# Patient Record
Sex: FEMALE | Race: WHITE | HISPANIC OR LATINO | URBAN - METROPOLITAN AREA
[De-identification: names, ages, dates, MRNs, and addresses within clinical notes are randomized per-mention and may not be internally consistent; named-entity substitution may affect disease eponyms.]

---

## 2021-01-08 ENCOUNTER — APPOINTMENT (RX ONLY)
Dept: URBAN - METROPOLITAN AREA CLINIC 55 | Facility: CLINIC | Age: 41
Setting detail: DERMATOLOGY
End: 2021-01-08

## 2021-01-08 DIAGNOSIS — Z41.9 ENCOUNTER FOR PROCEDURE FOR PURPOSES OTHER THAN REMEDYING HEALTH STATE, UNSPECIFIED: ICD-10-CM

## 2021-01-08 PROCEDURE — ? BOTOX

## 2021-01-08 NOTE — PROCEDURE: BOTOX
Show Masseter Units: Yes
Show Right And Left Brow Units: No
Anterior Platysmal Bands Units: 0
Additional Area 3 Location: Chin
Consent: Written consent obtained. Risks include but not limited to lid/brow ptosis, bruising, swelling, diplopia, temporary effect, incomplete chemical denervation.
Post-Care Instructions: Patient instructed to not lie down for 4 hours and limit physical activity for 24 hours.
Detail Level: Detailed
Lot #: [id number]
Additional Area 2 Location: Upper lip
Additional Area 1 Location: Left Lateral Brow
Expiration Date (Month Year): 9/30/2023
Depressor Anguli Oris Units: 6
Forehead Units: 8
Dilution (U/0.1 Cc): 4

## 2021-04-02 ENCOUNTER — APPOINTMENT (RX ONLY)
Dept: URBAN - METROPOLITAN AREA CLINIC 55 | Facility: CLINIC | Age: 41
Setting detail: DERMATOLOGY
End: 2021-04-02

## 2021-04-02 DIAGNOSIS — Z41.9 ENCOUNTER FOR PROCEDURE FOR PURPOSES OTHER THAN REMEDYING HEALTH STATE, UNSPECIFIED: ICD-10-CM

## 2021-04-02 PROCEDURE — ? BOTOX

## 2021-04-02 NOTE — PROCEDURE: BOTOX
Left Pupillary Line Units: 0
Show Lateral Platysmal Band Units: Yes
Additional Area 3 Location: Chin
Dilution (U/0.1 Cc): 4
Additional Area 1 Location: Left Lateral Brow
Show Ucl Units: No
Depressor Anguli Oris Units: 6
Detail Level: Detailed
Additional Area 2 Location: Upper lip
Consent: Written consent obtained. Risks include but not limited to lid/brow ptosis, bruising, swelling, diplopia, temporary effect, incomplete chemical denervation.
Post-Care Instructions: Patient instructed to not lie down for 4 hours and limit physical activity for 24 hours.
Lot #: [id number]
Expiration Date (Month Year): 12/2023
Glabellar Complex Units: 8

## 2021-04-26 ENCOUNTER — RX ONLY (OUTPATIENT)
Age: 41
Setting detail: RX ONLY
End: 2021-04-26

## 2021-04-26 RX ORDER — VALACYCLOVIR 500 MG/1
TABLET, FILM COATED ORAL
Qty: 10 | Refills: 0 | Status: CANCELLED

## 2021-05-10 ENCOUNTER — RX ONLY (OUTPATIENT)
Age: 41
Setting detail: RX ONLY
End: 2021-05-10

## 2021-05-10 RX ORDER — VALACYCLOVIR 500 MG/1
TABLET, FILM COATED ORAL
Qty: 10 | Refills: 1 | Status: ERX

## 2021-07-02 ENCOUNTER — APPOINTMENT (RX ONLY)
Dept: URBAN - METROPOLITAN AREA CLINIC 55 | Facility: CLINIC | Age: 41
Setting detail: DERMATOLOGY
End: 2021-07-02

## 2021-07-02 DIAGNOSIS — Z41.9 ENCOUNTER FOR PROCEDURE FOR PURPOSES OTHER THAN REMEDYING HEALTH STATE, UNSPECIFIED: ICD-10-CM

## 2021-07-02 PROCEDURE — ? BOTOX

## 2021-07-02 NOTE — PROCEDURE: BOTOX
Periorbital Skin Units: 4
Right Periorbital Skin Units: 0
Show Periorbital Units: Yes
Lot #: [id number]
Show Right And Left Brow Units: No
Additional Area 3 Location: Chin
Consent: Written consent obtained. Risks include but not limited to lid/brow ptosis, bruising, swelling, diplopia, temporary effect, incomplete chemical denervation.
Depressor Anguli Oris Units: 6
Additional Area 1 Location: Left Lateral Brow
Expiration Date (Month Year): 10/2023
Post-Care Instructions: Patient instructed to not lie down for 4 hours and limit physical activity for 24 hours.
Glabellar Complex Units: 16
Additional Area 2 Location: Upper cutaneous lip
Detail Level: Detailed

## 2021-07-06 ENCOUNTER — APPOINTMENT (RX ONLY)
Dept: URBAN - METROPOLITAN AREA CLINIC 55 | Facility: CLINIC | Age: 41
Setting detail: DERMATOLOGY
End: 2021-07-06

## 2021-07-06 DIAGNOSIS — Z41.9 ENCOUNTER FOR PROCEDURE FOR PURPOSES OTHER THAN REMEDYING HEALTH STATE, UNSPECIFIED: ICD-10-CM

## 2021-07-06 PROCEDURE — ? CLEAR AND BRILLIANT LASER

## 2021-07-06 ASSESSMENT — LOCATION DETAILED DESCRIPTION DERM: LOCATION DETAILED: INFERIOR MID FOREHEAD

## 2021-07-06 ASSESSMENT — LOCATION ZONE DERM: LOCATION ZONE: FACE

## 2021-07-06 ASSESSMENT — LOCATION SIMPLE DESCRIPTION DERM: LOCATION SIMPLE: INFERIOR FOREHEAD

## 2021-07-06 NOTE — PROCEDURE: CLEAR AND BRILLIANT LASER
Post-Procedure Care: Sunscreen and ice applied. Post care reviewed with patient.
Post-Care Instructions: I reviewed with the patient in detail post-care instructions. Patient should stay away from the sun and wear sun protection until treated areas are fully healed.
Consent: Verbal consent obtained, risks reviewed including but not limited to crusting, scabbing, blistering, scarring, darker or lighter pigmentary change, incidental hair removal, bruising, and/or incomplete removal.
Laser Type: Clear+Brilliant
Detail Level: Zone
Topical Anesthesia?: 20% benzocaine, 8% lidocaine, 4% tetracaine
Energy Level: High
Length Of Topical Anesthesia Application (Optional): 60 minutes

## 2021-09-23 ENCOUNTER — RX ONLY (OUTPATIENT)
Age: 41
Setting detail: RX ONLY
End: 2021-09-23

## 2021-09-24 ENCOUNTER — APPOINTMENT (RX ONLY)
Dept: URBAN - METROPOLITAN AREA CLINIC 55 | Facility: CLINIC | Age: 41
Setting detail: DERMATOLOGY
End: 2021-09-24

## 2021-09-24 DIAGNOSIS — Z41.9 ENCOUNTER FOR PROCEDURE FOR PURPOSES OTHER THAN REMEDYING HEALTH STATE, UNSPECIFIED: ICD-10-CM

## 2021-09-24 PROCEDURE — ? DYSPORT

## 2021-09-24 NOTE — PROCEDURE: DYSPORT
Masseter Units: 0
Show Lateral Platysmal Band Units: Yes
Lot #: [id number]
Detail Level: Detailed
Consent: Written consent obtained. Risks include but not limited to lid/brow ptosis, bruising, swelling, diplopia, temporary effect, incomplete chemical denervation.
Dilution (U/0.1 Cc): 10
Show Mentalis Units: No
Glabellar Complex Units: 20
Additional Area 3 Location: Chin
Depressor Anguli Oris Units: 15
Expiration Date (Month Year): 5/31/22
Additional Area 5 Location: right lateral brow
Additional Area 4 Location: left lateral 
Post-Care Instructions: Patient instructed to not lie down for 4 hours and limit physical activity for 24 hours.
Additional Area 2 Location: Upper Lip

## 2021-11-04 ENCOUNTER — APPOINTMENT (RX ONLY)
Dept: URBAN - METROPOLITAN AREA CLINIC 55 | Facility: CLINIC | Age: 41
Setting detail: DERMATOLOGY
End: 2021-11-04

## 2021-11-04 DIAGNOSIS — Z41.9 ENCOUNTER FOR PROCEDURE FOR PURPOSES OTHER THAN REMEDYING HEALTH STATE, UNSPECIFIED: ICD-10-CM

## 2021-11-04 PROCEDURE — ? FRAXEL

## 2021-11-04 NOTE — PROCEDURE: FRAXEL
Number Of Passes: 1
Total Energy In Kj (Optional- Don't Include Units): 1.93
Topical Anesthesia Type: 20% benzocaine, 8% lidocaine, 4% tetracaine
Number Of Passes: 8
Treatment Level: 4
Location: perioral area
Energy(Mj/Cm2): 20
Consent: Verbal consent obtained, risks reviewed including but not limited to pain and incomplete improvement.
Anesthesia Type: 1% lidocaine with epinephrine
Total Coverage: 35%
Depth In Microns (Use Numbers Only, No Special Characters Or $): 202
Total Energy In Kj (Optional- Don't Include Units): 0.25
Depth In Microns (Use Numbers Only, No Special Characters Or $): 036
Treatment Number: 2
Length Of Topical Anesthesia Application (Optional): 60 minutes
Total Coverage: 50%
Post-Care Instructions: I reviewed with the patient in detail post-care instructions. Patient should avoid sun until area fully healed. Samples of Epidermal Repair, Journee and Vaseline sent home with patient.
Energy(Mj/Cm2): 15
Total Coverage: 23%
External Cooling Fan Speed: 5
Wavelength: 1550nm
Wavelength: 1927nm
Treatment Level: 7
Indication: dyschromia
Location: full face
Pre-Procedure Text (Will Appear After Anesthesia Text): Photos obtained. BLT removed with dry gauze. Face was cleansed with a gentle cleanser and then prepped with chlorhexidine gluconate.
Add Post-Care Below To The Note: Yes
Location: cheeks
Detail Level: Zone

## 2021-12-07 ENCOUNTER — APPOINTMENT (RX ONLY)
Dept: URBAN - METROPOLITAN AREA CLINIC 55 | Facility: CLINIC | Age: 41
Setting detail: DERMATOLOGY
End: 2021-12-07

## 2021-12-07 DIAGNOSIS — Z41.9 ENCOUNTER FOR PROCEDURE FOR PURPOSES OTHER THAN REMEDYING HEALTH STATE, UNSPECIFIED: ICD-10-CM

## 2021-12-07 PROCEDURE — ? DYSPORT

## 2021-12-07 NOTE — PROCEDURE: DYSPORT
Glabellar Complex Units: 20
Show Depressor Anguli Units: Yes
Additional Area 5 Location: right lateral brow
Detail Level: Detailed
Depressor Anguli Oris Units: 15
Nasal Root Units: 0
Additional Area 3 Location: Chin
Consent: Written consent obtained. Risks include but not limited to lid/brow ptosis, bruising, swelling, diplopia, temporary effect, incomplete chemical denervation.
Show Lcl Units: No
Additional Area 1 Location: Lateral Brows
Periorbital Skin Units: 10
Additional Area 4 Location: left lateral 
Lot #: [id number]
Additional Area 2 Location: Upper Lip
Expiration Date (Month Year): 7/31/22
Post-Care Instructions: Patient instructed to not lie down for 4 hours and limit physical activity for 24 hours.

## 2022-03-10 ENCOUNTER — APPOINTMENT (RX ONLY)
Dept: URBAN - METROPOLITAN AREA CLINIC 55 | Facility: CLINIC | Age: 42
Setting detail: DERMATOLOGY
End: 2022-03-10

## 2022-03-10 DIAGNOSIS — Z41.9 ENCOUNTER FOR PROCEDURE FOR PURPOSES OTHER THAN REMEDYING HEALTH STATE, UNSPECIFIED: ICD-10-CM

## 2022-03-10 PROCEDURE — ? BOTOX

## 2022-03-10 NOTE — PROCEDURE: BOTOX
Additional Area 5 Units: 0
Additional Area 1 Location: Lateral Brows
Show Right And Left Brow Units: No
Expiration Date (Month Year): 4/2024
Show Additional Area 3: Yes
Depressor Anguli Oris Units: 6
Detail Level: Detailed
Additional Area 3 Location: Chin
Periorbital Skin Units: 4
Additional Area 2 Location: Upper lip
Post-Care Instructions: Patient instructed to not lie down for 4 hours and limit physical activity for 24 hours.
Consent: Written consent obtained. Risks include but not limited to lid/brow ptosis, bruising, swelling, diplopia, temporary effect, incomplete chemical denervation.
Glabellar Complex Units: 8
Additional Area 4 Location: upper cutaneous lip
Lot #: [id number]

## 2022-06-17 ENCOUNTER — APPOINTMENT (RX ONLY)
Dept: URBAN - METROPOLITAN AREA CLINIC 55 | Facility: CLINIC | Age: 42
Setting detail: DERMATOLOGY
End: 2022-06-17

## 2022-06-17 DIAGNOSIS — Z41.9 ENCOUNTER FOR PROCEDURE FOR PURPOSES OTHER THAN REMEDYING HEALTH STATE, UNSPECIFIED: ICD-10-CM

## 2022-06-17 PROCEDURE — ? BOTOX

## 2022-06-17 NOTE — PROCEDURE: BOTOX
Show Depressor Anguli Units: Yes
R Brow Units: 0
Show Mentalis Units: No
Forehead Units: 6
Lot #: [id number]
Glabellar Complex Units: 8
Additional Area 1 Location: Right Lateral Brow
Post-Care Instructions: Patient instructed to not lie down for 4 hours and limit physical activity for 24 hours.
Additional Area 2 Location: Upper lip
Dilution (U/0.1 Cc): 4
Additional Area 3 Location: Chin
Expiration Date (Month Year): 4/2024
Detail Level: Detailed
Additional Area 5 Location: lower eye lids
Consent: Written consent obtained. Risks include but not limited to lid/brow ptosis, bruising, swelling, diplopia, temporary effect, incomplete chemical denervation.

## 2022-08-30 ENCOUNTER — APPOINTMENT (RX ONLY)
Dept: URBAN - METROPOLITAN AREA CLINIC 55 | Facility: CLINIC | Age: 42
Setting detail: DERMATOLOGY
End: 2022-08-30

## 2022-08-30 DIAGNOSIS — Z41.9 ENCOUNTER FOR PROCEDURE FOR PURPOSES OTHER THAN REMEDYING HEALTH STATE, UNSPECIFIED: ICD-10-CM

## 2022-08-30 PROCEDURE — ? BOTOX

## 2022-08-30 NOTE — PROCEDURE: BOTOX
Additional Area 3 Units: 0
Show Ucl Units: No
Show Additional Area 5: Yes
Detail Level: Detailed
Forehead Units: 6
Additional Area 1 Location: upper lip
Glabellar Complex Units: 8
Show Inventory Tab: Show
Dilution (U/0.1 Cc): 4
Additional Area 2 Location: chin
Consent: Verbal consent obtained. Risks include but not limited to lid/brow ptosis, bruising, swelling, diplopia, temporary effect, incomplete chemical denervation.
Post-Care Instructions: Patient instructed to not lie down for 4 hours and limit physical activity for 24 hours.

## 2022-11-23 ENCOUNTER — APPOINTMENT (RX ONLY)
Dept: URBAN - METROPOLITAN AREA CLINIC 55 | Facility: CLINIC | Age: 42
Setting detail: DERMATOLOGY
End: 2022-11-23

## 2022-11-23 DIAGNOSIS — Z41.9 ENCOUNTER FOR PROCEDURE FOR PURPOSES OTHER THAN REMEDYING HEALTH STATE, UNSPECIFIED: ICD-10-CM

## 2022-11-23 PROCEDURE — ? BOTOX

## 2022-11-23 NOTE — PROCEDURE: BOTOX
Right Pupillary Line Units: 0
Show Right And Left Periorbital Units: No
Show Anterior Platysmal Band Units: Yes
Depressor Anguli Oris Units: 8
Show Inventory Tab: Show
Additional Area 2 Location: chin
Dilution (U/0.1 Cc): 4
Consent: Verbal consent obtained. Risks include but not limited to lid/brow ptosis, bruising, swelling, diplopia, temporary effect, incomplete chemical denervation.
Post-Care Instructions: Patient instructed to not lie down for 4 hours and limit physical activity for 24 hours.
Detail Level: Detailed
Additional Area 1 Location: upper lip

## 2023-02-01 ENCOUNTER — APPOINTMENT (RX ONLY)
Dept: URBAN - METROPOLITAN AREA CLINIC 55 | Facility: CLINIC | Age: 43
Setting detail: DERMATOLOGY
End: 2023-02-01

## 2023-02-01 DIAGNOSIS — Z41.9 ENCOUNTER FOR PROCEDURE FOR PURPOSES OTHER THAN REMEDYING HEALTH STATE, UNSPECIFIED: ICD-10-CM

## 2023-02-01 PROCEDURE — ? BOTOX

## 2023-02-01 NOTE — PROCEDURE: BOTOX
Levator Labii Superioris Units: 0
Show Ucl Units: No
Show Additional Area 1: Yes
Consent: Verbal consent obtained. Risks include but not limited to lid/brow ptosis, bruising, swelling, diplopia, temporary effect, incomplete chemical denervation.
Additional Area 1 Location: upper lip
Incrementing Botox Units: By 0.5 Units
Dilution (U/0.1 Cc): 4
Post-Care Instructions: Patient instructed to not lie down for 4 hours and limit physical activity for 24 hours.
Detail Level: Detailed
Forehead Units: 8
Show Inventory Tab: Show
Additional Area 2 Location: chin

## 2023-03-28 ENCOUNTER — APPOINTMENT (RX ONLY)
Dept: URBAN - METROPOLITAN AREA CLINIC 55 | Facility: CLINIC | Age: 43
Setting detail: DERMATOLOGY
End: 2023-03-28

## 2023-03-28 DIAGNOSIS — Z41.9 ENCOUNTER FOR PROCEDURE FOR PURPOSES OTHER THAN REMEDYING HEALTH STATE, UNSPECIFIED: ICD-10-CM

## 2023-03-28 PROCEDURE — ? BOTOX

## 2023-03-28 PROCEDURE — ? INVENTORY

## 2023-03-28 PROCEDURE — ? IN-HOUSE DISPENSING PHARMACY

## 2023-03-28 NOTE — PROCEDURE: IN-HOUSE DISPENSING PHARMACY
Product 44 Amount/Unit (Numbers Only): 0
Product 14 Unit Type: mg
Product 1 Amount/Unit (Numbers Only): 30
Name Of Product 1: Anti-Aging Brightening Cream
Product 2 Refills: 11
Product 6 Unit Type: ml
Product 3 Application Directions: Apply nightly or as directed. Use SPF daily.
Detail Level: Zone
Send Charges To Patient Encounter: No
Product 4 Refills: 2
Product 2 Application Directions: Apply nightly or as directed.
Render Product Pricing In Note: Yes
Name Of Product 7: Lidocaine 23% / Tetracaine 7% Cream
Name Of Product 6: Rosacea Triple Combination Gel
Product 7 Application Directions: Apply only as directed
Name Of Product 3: Acne Triple Combination Gel
Product 4 Units Dispensed: 1
Name Of Product 5: Hydrating Tretinoin 0.025% Cream
Name Of Product 2: Dapsone / Spironolactone Gel
Name Of Product 4: Hydroquinone 8% Emulsion

## 2023-03-28 NOTE — PROCEDURE: BOTOX
Additional Area 2 Units: 0
Show Mentalis Units: No
Show Periorbital Units: Yes
Additional Area 1 Location: upper lip
Incrementing Botox Units: By 0.5 Units
Consent: Verbal consent obtained. Risks include but not limited to lid/brow ptosis, bruising, swelling, diplopia, temporary effect, incomplete chemical denervation.
Dilution (U/0.1 Cc): 4
Post-Care Instructions: Patient instructed to not lie down for 4 hours and limit physical activity for 24 hours.
Detail Level: Detailed
Show Inventory Tab: Show
Additional Area 2 Location: chin

## 2023-07-17 ENCOUNTER — APPOINTMENT (RX ONLY)
Dept: URBAN - METROPOLITAN AREA CLINIC 55 | Facility: CLINIC | Age: 43
Setting detail: DERMATOLOGY
End: 2023-07-17

## 2023-07-17 DIAGNOSIS — Z41.9 ENCOUNTER FOR PROCEDURE FOR PURPOSES OTHER THAN REMEDYING HEALTH STATE, UNSPECIFIED: ICD-10-CM

## 2023-07-17 PROCEDURE — ? BOTOX

## 2023-07-17 NOTE — PROCEDURE: BOTOX
Show Lateral Platysmal Band Units: Yes
Additional Area 3 Units: 0
Show Right And Left Brow Units: No
Periorbital Skin Units: 8
Additional Area 1 Location: upper lip
Consent: Verbal consent obtained. Risks include but not limited to lid/brow ptosis, bruising, swelling, diplopia, temporary effect, incomplete chemical denervation.
Post-Care Instructions: Patient instructed to not lie down for 4 hours and limit physical activity for 24 hours.
Dilution (U/0.1 Cc): 4
Incrementing Botox Units: By 0.5 Units
Detail Level: Detailed
Additional Area 3 Location: lower lip
Forehead Units: 12
Show Inventory Tab: Show
Additional Area 2 Location: chin

## 2023-09-14 ENCOUNTER — APPOINTMENT (RX ONLY)
Dept: URBAN - METROPOLITAN AREA CLINIC 55 | Facility: CLINIC | Age: 43
Setting detail: DERMATOLOGY
End: 2023-09-14

## 2023-09-14 DIAGNOSIS — Z41.9 ENCOUNTER FOR PROCEDURE FOR PURPOSES OTHER THAN REMEDYING HEALTH STATE, UNSPECIFIED: ICD-10-CM

## 2023-09-14 PROCEDURE — ? INVENTORY

## 2023-09-14 PROCEDURE — ? IN-HOUSE DISPENSING PHARMACY

## 2023-09-14 NOTE — PROCEDURE: IN-HOUSE DISPENSING PHARMACY
Product 21 Unit Type: mg
Product 47 Refills: 0
Product 4 Amount/Unit (Numbers Only): 30
Render Product Pricing In Note: Yes
Name Of Product 7: Lidocaine 23% / Tetracaine 7% Cream
Name Of Product 1: Anti-Aging Brightening Cream
Product 5 Unit Type: ml
Product 7 Application Directions: Apply only as directed
Send Charges To Patient Encounter: No
Product 4 Application Directions: Apply nightly or as directed.
Name Of Product 5: Hydrating Tretinoin 0.025% Cream
Product 7 Refills: 2
Product 5 Refills: 11
Detail Level: Zone
Product 4 Units Dispensed: 1
Product 3 Application Directions: Apply nightly or as directed. Use SPF daily.
Name Of Product 3: Acne Triple Combination Gel
Name Of Product 2: Dapsone / Spironolactone Gel
Name Of Product 4: Hydroquinone 8% Emulsion
Name Of Product 6: Rosacea Triple Combination Gel

## 2023-10-10 ENCOUNTER — APPOINTMENT (RX ONLY)
Dept: URBAN - METROPOLITAN AREA CLINIC 55 | Facility: CLINIC | Age: 43
Setting detail: DERMATOLOGY
End: 2023-10-10

## 2023-10-10 DIAGNOSIS — Z41.9 ENCOUNTER FOR PROCEDURE FOR PURPOSES OTHER THAN REMEDYING HEALTH STATE, UNSPECIFIED: ICD-10-CM

## 2023-10-10 PROCEDURE — ? BOTOX

## 2023-10-10 NOTE — PROCEDURE: BOTOX
Right Pupillary Line Units: 0
Periorbital Skin Units: 8
Show Periorbital Units: Yes
Show Ucl Units: No
Additional Area 1 Location: upper lip
Consent: Verbal consent obtained. Risks include but not limited to lid/brow ptosis, bruising, swelling, diplopia, temporary effect, incomplete chemical denervation.
Post-Care Instructions: Patient instructed to not lie down for 4 hours and limit physical activity for 24 hours.
Dilution (U/0.1 Cc): 4
Incrementing Botox Units: By 0.5 Units
Detail Level: Detailed
Additional Area 3 Location: lower lip
Forehead Units: 12
Show Inventory Tab: Show
Additional Area 2 Location: chin

## 2023-10-23 ENCOUNTER — APPOINTMENT (RX ONLY)
Dept: URBAN - METROPOLITAN AREA CLINIC 55 | Facility: CLINIC | Age: 43
Setting detail: DERMATOLOGY
End: 2023-10-23

## 2023-10-23 DIAGNOSIS — Z41.9 ENCOUNTER FOR PROCEDURE FOR PURPOSES OTHER THAN REMEDYING HEALTH STATE, UNSPECIFIED: ICD-10-CM

## 2023-10-23 PROCEDURE — ? FRAXEL

## 2023-10-23 PROCEDURE — ? INVENTORY

## 2023-10-23 NOTE — PROCEDURE: FRAXEL
Energy(Mj/Cm2): 1
Total Coverage: 35%
External Cooling Fan Speed: 5
Consent obtained, risks reviewed.
Medium Metal Eye Shield Text: The ocular mucosa was anesthetized with tetracaine. Once adequate anesthesia was optained, medium metal eye shields were inserted and remained in place until the procedure was completed.
Large Plastic Eye Shield Text: The ocular mucosa was anesthetized with tetracaine. Once adequate anesthesia was optained, large plastic eye shields were inserted and remained in place until the procedure was completed.
Location: Use Location Override
Treatment Number: 3
Total Energy In Kj (Optional- Don't Include Units): 1.55
Topical Anesthesia Type: 23% lidocaine, 7% tetracaine
Energy(Mj/Cm2): 10
Total Coverage: 25%
Add Post-Care Below To The Note: Yes
Location: full face
Detail Level: Zone
Treatment Level: 2
Energy(Mj/Cm2): 20
Anesthesia Type: 1% lidocaine with epinephrine
Large Metal Eye Shield Text: The ocular mucosa was anesthetized with tetracaine. Once adequate anesthesia was optained, large metal eye shields were inserted and remained in place until the procedure was completed.
Number Of Passes: 4
Wavelength: 1927nm
Energy(Mj/Cm2): 40
Post-Care Instructions: Topical anesthetic removed with gauze. Skin cleansed with a gentle  and prepped with Hibiclens.\\nCO2 lift mask applied post treatment and advised to keep on for 1 hour. Ice packs sent home with patient. \\nI reviewed with the patient in detail post-care instructions. Patient should avoid sun until area fully healed.
Was An Eye Shield Used?: No
Depth In Microns (Use Numbers Only, No Special Characters Or $): 192
Length Of Topical Anesthesia Application (Optional): 60 minutes
Number Of Passes: 8
Treatment Level: 7
Anesthesia Volume In Cc: 0.3
Small Plastic Eye Shield Text: The ocular mucosa was anesthetized with tetracaine. Once adequate anesthesia was optained, small plastic eye shields were inserted and remained in place until the procedure was completed.
Indication: dyschromia
Depth In Microns (Use Numbers Only, No Special Characters Or $): 202
Small Metal Eye Shield Text: The ocular mucosa was anesthetized with tetracaine. Once adequate anesthesia was optained, small metal eye shields were inserted and remained in place until the procedure was completed.
Medium Plastic Eye Shield Text: The ocular mucosa was anesthetized with tetracaine. Once adequate anesthesia was optained, medium plastic eye shields were inserted and remained in place until the procedure was completed.
Location: lower face

## 2024-01-08 ENCOUNTER — APPOINTMENT (RX ONLY)
Dept: URBAN - METROPOLITAN AREA CLINIC 55 | Facility: CLINIC | Age: 44
Setting detail: DERMATOLOGY
End: 2024-01-08

## 2024-01-12 ENCOUNTER — APPOINTMENT (RX ONLY)
Dept: URBAN - METROPOLITAN AREA CLINIC 55 | Facility: CLINIC | Age: 44
Setting detail: DERMATOLOGY
End: 2024-01-12

## 2024-01-12 DIAGNOSIS — Z41.9 ENCOUNTER FOR PROCEDURE FOR PURPOSES OTHER THAN REMEDYING HEALTH STATE, UNSPECIFIED: ICD-10-CM

## 2024-01-12 PROCEDURE — ? BOTOX

## 2024-01-12 NOTE — PROCEDURE: BOTOX
Masseter Units: 0
Periorbital Skin Units: 8
Show Anterior Platysmal Band Units: Yes
Show Right And Left Pupillary Line Units: No
Additional Area 2 Location: chin
Incrementing Botox Units: By 0.5 Units
Consent: Verbal consent obtained. Risks include but not limited to lid/brow ptosis, bruising, swelling, diplopia, temporary effect, incomplete chemical denervation.
Detail Level: Detailed
Dilution (U/0.1 Cc): 4
Post-Care Instructions: Patient instructed to not lie down for 4 hours and limit physical activity for 24 hours.
Forehead Units: 12
Show Inventory Tab: Show
Additional Area 3 Location: lower lip
Additional Area 1 Location: upper lip

## 2024-04-05 ENCOUNTER — APPOINTMENT (RX ONLY)
Dept: URBAN - METROPOLITAN AREA CLINIC 55 | Facility: CLINIC | Age: 44
Setting detail: DERMATOLOGY
End: 2024-04-05

## 2024-04-05 DIAGNOSIS — Z41.9 ENCOUNTER FOR PROCEDURE FOR PURPOSES OTHER THAN REMEDYING HEALTH STATE, UNSPECIFIED: ICD-10-CM

## 2024-04-05 PROCEDURE — ? BOTOX

## 2024-04-05 NOTE — PROCEDURE: BOTOX
Additional Area 5 Units: 0
Show Right And Left Periorbital Units: No
Additional Area 1 Location: upper lip
Show Masseter Units: Yes
Periorbital Skin Units: 8
Dilution (U/0.1 Cc): 4
Post-Care Instructions: Patient instructed to not lie down for 4 hours and limit physical activity for 24 hours.
Incrementing Botox Units: By 0.5 Units
Detail Level: Detailed
Additional Area 3 Location: lower lip
Forehead Units: 12
Show Inventory Tab: Show
Additional Area 2 Location: chin
Consent: Verbal consent obtained. Risks include but not limited to lid/brow ptosis, bruising, swelling, diplopia, temporary effect, incomplete chemical denervation.

## 2024-06-14 ENCOUNTER — APPOINTMENT (RX ONLY)
Dept: URBAN - METROPOLITAN AREA CLINIC 55 | Facility: CLINIC | Age: 44
Setting detail: DERMATOLOGY
End: 2024-06-14

## 2024-06-14 DIAGNOSIS — Z41.9 ENCOUNTER FOR PROCEDURE FOR PURPOSES OTHER THAN REMEDYING HEALTH STATE, UNSPECIFIED: ICD-10-CM

## 2024-06-14 PROCEDURE — ? BOTOX

## 2024-06-14 NOTE — PROCEDURE: BOTOX
[facility] Units: 0
Additional Area 1 Location: upper lip
Incrementing Botox Units: By 0.5 Units
Show Masseter Units: Yes
Post-Care Instructions: Patient instructed to not lie down for 4 hours and limit physical activity for 24 hours.
Show Right And Left Pupillary Line Units: No
Show Inventory Tab: Show
Additional Area 1 Units: 2
Forehead Units: 12
Additional Area 3 Location: lower lip
Glabellar Complex Units: 8
Additional Area 2 Location: chin
Consent: Verbal consent obtained. Risks include but not limited to lid/brow ptosis, bruising, swelling, diplopia, temporary effect, incomplete chemical denervation.
Detail Level: Detailed
Dilution (U/0.1 Cc): 4

## 2024-08-27 ENCOUNTER — APPOINTMENT (RX ONLY)
Dept: URBAN - METROPOLITAN AREA CLINIC 55 | Facility: CLINIC | Age: 44
Setting detail: DERMATOLOGY
End: 2024-08-27

## 2024-08-27 DIAGNOSIS — Z41.9 ENCOUNTER FOR PROCEDURE FOR PURPOSES OTHER THAN REMEDYING HEALTH STATE, UNSPECIFIED: ICD-10-CM

## 2024-08-27 PROCEDURE — ? BOTOX

## 2024-08-27 NOTE — PROCEDURE: BOTOX
Inferior Lateral Orbicularis Oculi Units: 0
Glabellar Complex Units: 8
Show Additional Area 2: Yes
Forehead Units: 12
Show Ucl Units: No
Show Inventory Tab: Show
Additional Area 2 Location: chin
Consent: Verbal consent obtained. Risks include but not limited to lid/brow ptosis, bruising, swelling, diplopia, temporary effect, incomplete chemical denervation.
Additional Area 1 Units: 2
Dilution (U/0.1 Cc): 4
Additional Area 1 Location: upper lip
Post-Care Instructions: Patient instructed to not lie down for 4 hours and limit physical activity for 24 hours.
Incrementing Botox Units: By 0.5 Units
Additional Area 3 Location: lower lip
Detail Level: Detailed

## 2024-10-08 ENCOUNTER — APPOINTMENT (RX ONLY)
Dept: URBAN - METROPOLITAN AREA CLINIC 55 | Facility: CLINIC | Age: 44
Setting detail: DERMATOLOGY
End: 2024-10-08

## 2024-10-08 ENCOUNTER — RX ONLY (OUTPATIENT)
Age: 44
Setting detail: RX ONLY
End: 2024-10-08

## 2024-10-08 DIAGNOSIS — Z41.9 ENCOUNTER FOR PROCEDURE FOR PURPOSES OTHER THAN REMEDYING HEALTH STATE, UNSPECIFIED: ICD-10-CM

## 2024-10-08 PROCEDURE — ? FRAXEL

## 2024-10-08 PROCEDURE — ? INVENTORY

## 2024-10-08 NOTE — PROCEDURE: FRAXEL
Number Of Passes: 1
Treatment Level: 4
Depth In Microns (Use Numbers Only, No Special Characters Or $): 202
Location: neck
Total Coverage: 9%
External Cooling Fan Speed: 5
Large Metal Eye Shield Text: The ocular mucosa was anesthetized with tetracaine. Once adequate anesthesia was optained, large metal eye shields were inserted and remained in place until the procedure was completed.
Energy(Mj/Cm2): 20
Location: full face
Depth In Microns (Use Numbers Only, No Special Characters Or $): 396
Post-Care Instructions: Topical anesthetic removed with gauze. Skin cleansed with a gentle  and prepped with Hibiclens.\\nCO2 lift mask applied post treatment and advised to keep on for 1 hour. Ice packs sent home with patient. \\nI reviewed with the patient in detail post-care instructions. Patient should avoid sun until area fully healed.
Treatment Level: 3
Indication: photodamage
Total Energy In Kj (Optional- Don't Include Units): 2.02
Total Coverage: 30%
Medium Metal Eye Shield Text: The ocular mucosa was anesthetized with tetracaine. Once adequate anesthesia was optained, medium metal eye shields were inserted and remained in place until the procedure was completed.
Large Plastic Eye Shield Text: The ocular mucosa was anesthetized with tetracaine. Once adequate anesthesia was optained, large plastic eye shields were inserted and remained in place until the procedure was completed.
Length Of Topical Anesthesia Application (Optional): 60 minutes
Anesthesia Volume In Cc: 0.5
Depth In Microns (Use Numbers Only, No Special Characters Or $): 199
Add Post-Care Below To The Note: Yes
Total Coverage: 35%
Small Metal Eye Shield Text: The ocular mucosa was anesthetized with tetracaine. Once adequate anesthesia was optained, small metal eye shields were inserted and remained in place until the procedure was completed.
Medium Plastic Eye Shield Text: The ocular mucosa was anesthetized with tetracaine. Once adequate anesthesia was optained, medium plastic eye shields were inserted and remained in place until the procedure was completed.
Detail Level: Zone
Energy(Mj/Cm2): 15
Consent obtained, risks reviewed.
Wavelength: 1927nm
Anesthesia Type: 1% lidocaine with epinephrine
Topical Anesthesia Type: 20% benzocaine, 8% lidocaine, 4% tetracaine
Was An Eye Shield Used?: No
Number Of Passes: 8
Small Plastic Eye Shield Text: The ocular mucosa was anesthetized with tetracaine. Once adequate anesthesia was optained, small plastic eye shields were inserted and remained in place until the procedure was completed.

## 2024-11-18 ENCOUNTER — APPOINTMENT (RX ONLY)
Dept: URBAN - METROPOLITAN AREA CLINIC 55 | Facility: CLINIC | Age: 44
Setting detail: DERMATOLOGY
End: 2024-11-18

## 2024-11-18 DIAGNOSIS — Z41.9 ENCOUNTER FOR PROCEDURE FOR PURPOSES OTHER THAN REMEDYING HEALTH STATE, UNSPECIFIED: ICD-10-CM

## 2024-11-18 PROCEDURE — ? BOTOX

## 2024-11-18 NOTE — PROCEDURE: BOTOX
Additional Area 1 Units: 0
Show Right And Left Brow Units: No
Show Additional Area 3: Yes
Periorbital Skin Units: 8
Additional Area 2 Location: chin
Incrementing Botox Units: By 0.5 Units
Detail Level: Detailed
Additional Area 1 Location: upper lip
Forehead Units: 12
Dilution (U/0.1 Cc): 4
Post-Care Instructions: Patient instructed to not lie down for 4 hours and limit physical activity for 24 hours.
Additional Area 1 Units: 2
Show Inventory Tab: Show
Consent: Verbal consent obtained. Risks include but not limited to lid/brow ptosis, bruising, swelling, diplopia, temporary effect, incomplete chemical denervation.
Additional Area 3 Location: lower lip

## 2024-12-13 ENCOUNTER — APPOINTMENT (OUTPATIENT)
Dept: URBAN - METROPOLITAN AREA CLINIC 55 | Facility: CLINIC | Age: 44
Setting detail: DERMATOLOGY
End: 2024-12-13

## 2024-12-13 DIAGNOSIS — Z41.9 ENCOUNTER FOR PROCEDURE FOR PURPOSES OTHER THAN REMEDYING HEALTH STATE, UNSPECIFIED: ICD-10-CM

## 2024-12-13 PROCEDURE — ? COSMETIC FOLLOW-UP

## 2024-12-13 PROCEDURE — ? INVENTORY

## 2024-12-13 PROCEDURE — ? PLATELET RICH PLASMA INJECTION

## 2024-12-13 NOTE — PROCEDURE: PLATELET RICH PLASMA INJECTION
Depth In Mm (Will Not Render If 0): 0
Topical Anesthesia Type: 20% benzocaine, 8% lidocaine, 4% tetracaine
Which Technique?: Default
Anesthesia Type: 1% lidocaine with epinephrine
Venipuncture Paragraph: An alcohol pad was used to cleanse venipuncture site. Venipuncture was performed using a butterfly needle. Pressure and coflex was applied to the site. No complications were noted.
Post-Care Instructions: Physical sunscreen applied post treatment and an ice pack provided. After 2-3 days patients can return to their regular skin regimen.
Site Of Venipuncture?: Left AC
Amount Of Blood Processed (Optional - Include Units): 5 mL
Detail Level: Zone
Amount Of Blood Collected (Optional - Include Units): 11 mL
Consent obtained, risks reviewed.
External Cooling Fan Speed: 5
Show Additional Techniques: Yes

## 2025-01-28 ENCOUNTER — APPOINTMENT (OUTPATIENT)
Dept: URBAN - METROPOLITAN AREA CLINIC 55 | Facility: CLINIC | Age: 45
Setting detail: DERMATOLOGY
End: 2025-01-28

## 2025-01-28 DIAGNOSIS — Z41.9 ENCOUNTER FOR PROCEDURE FOR PURPOSES OTHER THAN REMEDYING HEALTH STATE, UNSPECIFIED: ICD-10-CM

## 2025-01-28 PROCEDURE — ? BOTOX

## 2025-01-28 NOTE — PROCEDURE: BOTOX
Consent was obtained.
Anterior Platysmal Bands Units: 0
Show Ucl Units: No
Show Additional Area 6: Yes
Additional Area 1 Location: upper lip
Incrementing Botox Units: By 0.5 Units
Detail Level: Detailed
Dilution (U/0.1 Cc): 4
Post-Care Instructions: Patient instructed to not lie down for 4 hours and limit physical activity for 24 hours.
Additional Area 1 Units: 2
Forehead Units: 12
Glabellar Complex Units: 8
Show Inventory Tab: Show

## 2025-05-22 ENCOUNTER — APPOINTMENT (OUTPATIENT)
Dept: URBAN - METROPOLITAN AREA CLINIC 55 | Facility: CLINIC | Age: 45
Setting detail: DERMATOLOGY
End: 2025-05-22

## 2025-05-22 DIAGNOSIS — Z41.9 ENCOUNTER FOR PROCEDURE FOR PURPOSES OTHER THAN REMEDYING HEALTH STATE, UNSPECIFIED: ICD-10-CM

## 2025-05-22 PROCEDURE — ? BOTOX

## 2025-05-22 NOTE — PROCEDURE: BOTOX
Mentalis Units: 0
Show Mentalis Units: No
Show Glabellar Units: Yes
Periorbital Skin Units: 8
Incrementing Botox Units: By 0.5 Units
Additional Area 1 Location: upper lip
Detail Level: Detailed
Dilution (U/0.1 Cc): 4
Forehead Units: 12
Additional Area 1 Units: 2
Consent was obtained.
Show Inventory Tab: Show
Post-Care Instructions: Patient instructed to not lie down for 4 hours and limit physical activity for 24 hours.

## 2025-07-29 ENCOUNTER — APPOINTMENT (OUTPATIENT)
Dept: URBAN - METROPOLITAN AREA CLINIC 55 | Facility: CLINIC | Age: 45
Setting detail: DERMATOLOGY
End: 2025-07-29

## 2025-07-29 DIAGNOSIS — Z41.9 ENCOUNTER FOR PROCEDURE FOR PURPOSES OTHER THAN REMEDYING HEALTH STATE, UNSPECIFIED: ICD-10-CM

## 2025-07-29 PROCEDURE — ? IN-HOUSE DISPENSING PHARMACY

## 2025-07-29 PROCEDURE — ? INVENTORY

## 2025-07-29 PROCEDURE — ? BOTOX

## 2025-07-29 NOTE — PROCEDURE: IN-HOUSE DISPENSING PHARMACY
Product 47 Units Dispensed: 0
Product 68 Unit Type: mg
Product 3 Refills: 11
Product 1 Amount/Unit (Numbers Only): 30
Name Of Product 7: Lidocaine 23% / Tetracaine 7% Cream
Name Of Product 4: Hydroquinone 8% Emulsion
Product 3 Unit Type: ml
Product 6 Application Directions: Apply nightly or as directed. Use SPF daily.
Name Of Product 6: Rosacea Triple Combination Gel
Product 2 Application Directions: Apply nightly or as directed.
Product 1 Refills: 2
Product 7 Application Directions: Apply only as directed
Render Product Pricing In Note: Yes
Name Of Product 1: Anti-Aging Brightening Cream
Name Of Product 5: Hydrating Tretinoin 0.025% Cream
Detail Level: Zone
Name Of Product 3: Acne Triple Combination Gel
Name Of Product 2: Dapsone / Spironolactone Gel
Send Charges To Patient Encounter: No

## 2025-07-29 NOTE — PROCEDURE: BOTOX
Show Periorbital Units: Yes
Left Pupillary Line Units: 0
Additional Area 1 Location: upper lip
Incrementing Botox Units: By 0.5 Units
Detail Level: Detailed
Additional Area 1 Units: 2
Forehead Units: 12
Consent was obtained.
Show Ucl Units: No
Glabellar Complex Units: 8
Post-Care Instructions: Patient instructed to not lie down for 4 hours and limit physical activity for 24 hours.
Dilution (U/0.1 Cc): 4
Show Inventory Tab: Show